# Patient Record
Sex: MALE | Race: WHITE | URBAN - NONMETROPOLITAN AREA
[De-identification: names, ages, dates, MRNs, and addresses within clinical notes are randomized per-mention and may not be internally consistent; named-entity substitution may affect disease eponyms.]

---

## 2022-02-27 ENCOUNTER — APPOINTMENT (OUTPATIENT)
Dept: GENERAL RADIOLOGY | Age: 22
End: 2022-02-27
Payer: MEDICAID

## 2022-02-27 ENCOUNTER — HOSPITAL ENCOUNTER (EMERGENCY)
Age: 22
Discharge: HOME | End: 2022-02-27
Attending: EMERGENCY MEDICINE
Payer: MEDICAID

## 2022-02-27 VITALS
OXYGEN SATURATION: 97 % | HEART RATE: 85 BPM | HEIGHT: 66 IN | TEMPERATURE: 98.1 F | BODY MASS INDEX: 37.77 KG/M2 | SYSTOLIC BLOOD PRESSURE: 140 MMHG | WEIGHT: 235 LBS | DIASTOLIC BLOOD PRESSURE: 74 MMHG | RESPIRATION RATE: 16 BRPM

## 2022-02-27 DIAGNOSIS — S30.0XXA LUMBAR CONTUSION, INITIAL ENCOUNTER: Primary | ICD-10-CM

## 2022-02-27 PROCEDURE — 72100 X-RAY EXAM L-S SPINE 2/3 VWS: CPT

## 2022-02-27 PROCEDURE — 99283 EMERGENCY DEPT VISIT LOW MDM: CPT

## 2022-02-27 PROCEDURE — 6370000000 HC RX 637 (ALT 250 FOR IP): Performed by: EMERGENCY MEDICINE

## 2022-02-27 RX ORDER — NAPROXEN 500 MG/1
500 TABLET ORAL 2 TIMES DAILY PRN
Qty: 20 TABLET | Refills: 0 | Status: SHIPPED | OUTPATIENT
Start: 2022-02-27 | End: 2022-03-09

## 2022-02-27 RX ORDER — CYCLOBENZAPRINE HCL 10 MG
10 TABLET ORAL 3 TIMES DAILY PRN
Qty: 9 TABLET | Refills: 0 | Status: SHIPPED | OUTPATIENT
Start: 2022-02-27 | End: 2022-03-02

## 2022-02-27 RX ORDER — LIDOCAINE 50 MG/G
1 PATCH TOPICAL DAILY
Qty: 30 PATCH | Refills: 0 | Status: SHIPPED | OUTPATIENT
Start: 2022-02-27 | End: 2022-03-29

## 2022-02-27 RX ORDER — NAPROXEN 500 MG/1
500 TABLET ORAL ONCE
Status: COMPLETED | OUTPATIENT
Start: 2022-02-27 | End: 2022-02-27

## 2022-02-27 RX ORDER — CYCLOBENZAPRINE HCL 10 MG
10 TABLET ORAL ONCE
Status: COMPLETED | OUTPATIENT
Start: 2022-02-27 | End: 2022-02-27

## 2022-02-27 RX ORDER — VENLAFAXINE HYDROCHLORIDE 75 MG/1
CAPSULE, EXTENDED RELEASE ORAL
COMMUNITY
Start: 2022-01-17

## 2022-02-27 RX ORDER — LIDOCAINE 4 G/G
1 PATCH TOPICAL DAILY
Status: DISCONTINUED | OUTPATIENT
Start: 2022-02-27 | End: 2022-02-27 | Stop reason: HOSPADM

## 2022-02-27 RX ADMIN — CYCLOBENZAPRINE 10 MG: 10 TABLET, FILM COATED ORAL at 18:10

## 2022-02-27 RX ADMIN — NAPROXEN 500 MG: 500 TABLET ORAL at 18:08

## 2022-02-27 ASSESSMENT — PAIN SCALES - GENERAL
PAINLEVEL_OUTOF10: 10
PAINLEVEL_OUTOF10: 10

## 2022-02-27 ASSESSMENT — PAIN DESCRIPTION - PAIN TYPE: TYPE: ACUTE PAIN

## 2022-02-27 ASSESSMENT — PAIN DESCRIPTION - ORIENTATION: ORIENTATION: LOWER

## 2022-02-27 ASSESSMENT — PAIN DESCRIPTION - LOCATION: LOCATION: BACK

## 2022-02-27 ASSESSMENT — PAIN DESCRIPTION - DESCRIPTORS: DESCRIPTORS: TIGHTNESS

## 2022-02-27 ASSESSMENT — PAIN - FUNCTIONAL ASSESSMENT: PAIN_FUNCTIONAL_ASSESSMENT: 0-10

## 2022-02-27 NOTE — ED PROVIDER NOTES
Emergency [facility] EMERGENCY DEPARTMENT    Patient: Angelina Salinas  MRN: [id number]  : 2000  Date of Evaluation: 2022  ED Provider: Terrance Call DO    Chief Complaint       Chief Complaint   Patient presents with    Back Pain     C/o back pain after falling on ice last night. Patient has not had any medication for the pain. Rayo Bill is a 22 y.o. male who presents to the emergency department with complaints of pain in his lumbar spine after slipping and falling on the ice on Friday night landing on his back. He had the same problem happened Saturday night. He slipped on the ice falling onto his low back. The patient is complaining of 10 out of pain in his. It does not radiate. He denies any incontinence or saddle anesthesia. He has not taken anything for the pain. He denies radiation of the pain. Numbness weakness tingling is absent. Loss of bowel or bladder control is absent. Saddle anesthesia absent. Fevers absent. IV Drug Use absent. ROS:     At least 10 systems reviewed and otherwise acutely negative except as in the [address] Past History     Past Medical History:   Diagnosis Date    Depression      History reviewed. No pertinent surgical history.   Social History     Socioeconomic History    Marital status: Single     Spouse name: None    Number of children: None    Years of education: None    Highest education level: None   Occupational History    None   Tobacco Use    Smoking status: Never Smoker    Smokeless tobacco: Never Used   Vaping Use    Vaping Use: Never used   Substance and Sexual Activity    Alcohol use: No    Drug use: No    Sexual activity: None   Other Topics Concern    None   Social History Narrative    None     Social Determinants of Health     Financial Resource Strain:     Difficulty of Paying Living Expenses: Not on file   Food Insecurity:     Worried About Running Out of Food in the Last Year: Not on file    Ran Out of Food in the Last Year: Not on file   Transportation Needs:     Lack of Transportation (Medical): Not on file    Lack of Transportation (Non-Medical): Not on file   Physical Activity:     Days of Exercise per Week: Not on file    Minutes of Exercise per Session: Not on file   Stress:     Feeling of Stress : Not on file   Social Connections:     Frequency of Communication with Friends and Family: Not on file    Frequency of Social Gatherings with Friends and Family: Not on file    Attends Voodoo Services: Not on file    Active Member of [address] [place] [address] [employer] or Organizations: Not on file    Attends Club or Organization Meetings: Not on file    Marital Status: Not on file   Intimate Partner Violence:     Fear of Current or Ex-Partner: Not on file    Emotionally Abused: Not on file    Physically Abused: Not on file    Sexually Abused: Not on file   Housing Stability:     Unable to Pay for Housing in the Last Year: Not on file    Number of [place] in the Last Year: Not on file    Unstable Housing in the Last Year: Not on file       Medications/Allergies     Previous Medications    VENLAFAXINE (EFFEXOR XR) 75 MG EXTENDED RELEASE CAPSULE    TAKE 1 CAPSULE BY MOUTH EVERY DAY WITH FOOD     No Known Allergies     Physical Exam       ED Triage Vitals [02/27/22 1732]   BP Temp Temp Source Pulse Resp SpO2 Height Weight   (!) 148/74 98.1 °F (36.7 °C) Oral 92 18 98 % 5' 6\" (1.676 m) 235 lb (106.6 kg)     GENERAL APPEARANCE: Awake and alert. Cooperative. Nontoxic in appearance  HEAD: Normocephalic. EYES: Sclera anicteric. ENT: Tolerates saliva. NECK: Supple. LUNGS: Respirations unlabored. BACK: There is not thoracic or lumbar midline tenderness to palpation or step-offs. Paraspinal tenderness to palpation is  present in the lumbar paraspinal muscular region. No overlying rashes. LE strength is 5/5. LE light touch is intact. LE DTR's are 2+ in the patellas and achilles.  Straight leg test is negative on the RIGHT, negative on the LEFT. SKIN: Warm and dry. Diagnostics   Labs:  No results found for this visit on 02/27/22. Radiographs:  No results found. ED Course and MDM   In brief, Ronnie Conroy is a 22 y.o. male who presented to the emergency department patient presents to emergency department after falling twice and landing on his back after slipping on the ice. Lumbar spine x-ray is pending at this time. [facility] Please see his note regarding final disposition of this patient. He did receive Naprosyn Flexeril and Lidoderm here in the emergency department. ED Medication Orders (From admission, onward)    Start Ordered     Status Ordering Provider    02/27/22 1745 02/27/22 1739  naproxen (NAPROSYN) tablet 500 mg  ONCE         Last MAR action: Given - by Areta Shirts on 02/27/22 at [facility]    02/27/22 1745 02/27/22 1739  cyclobenzaprine (FLEXERIL) tablet 10 mg  ONCE         Last MAR action: Given - by Areta Shirts on 02/27/22 at [address] [place] [address]    02/27/22 1745 02/27/22 1739  lidocaine 4 % external patch 1 patch  DAILY         Last MAR action: Patch Applied - by Areta Shirts on 02/27/22 at [facility], [facility]          I estimate there is LOW risk for (including but not limited to) RAPIDLY EXPANDING OR RUPTURED AAA, AORTIC DISSECTION, CAUDA EQUINA SYNDROME, or EPIDURAL MASS LESION thus I consider the discharge disposition reasonable. Ronnie Conroy (or their surrogate) and I have discussed the diagnosis and risks, and we agree with discharging home with close follow-up. We also discussed returning to the Emergency Department immediately if new or worsening symptoms occur. We have discussed the symptoms which are most concerning that necessitate immediate return. Final Impression      1.  Lumbar contusion, initial encounter        DISPOSITION         (Please note that portions of this note may have been completed with a voice recognition program. Efforts were made to edit the dictations but occasionally words are mis-transcribed.)    Andrey Ling, [address] [place] [address],   02/27/22 [id number]

## 2022-02-28 NOTE — ED NOTES
Discharge instructions and prescriptions reviewed with pt and verbalizes understanding.      Pardeep Montano RN  02/27/22 Danisha Bradford

## 2022-02-28 NOTE — ED PROVIDER NOTES
ADDENDUM:    Care of the patient was assumed  from Dr. Mayra Zapien. I have reviewed the notes, assessments, and/or procedures performed, I concur with her/his documentation on Chepe Davis. I reviewed the medical record and evaluated the patient. ED COURSE/MDM:  Laboratory and imaging data were reviewed and care plan was arranged with the patient(see separate lab/imaging reports). RADIOLOGY:  Already resulted studies have been reviewed. XR LUMBAR SPINE (2-3 VIEWS)   Final Result   Unremarkable examination of the lumbar spine. Labs Reviewed - No data to display    Medications   lidocaine 4 % external patch 1 patch (1 patch TransDERmal Patch Applied 2/27/22 1808)   naproxen (NAPROSYN) tablet 500 mg (500 mg Oral Given 2/27/22 1808)   cyclobenzaprine (FLEXERIL) tablet 10 mg (10 mg Oral Given 2/27/22 1810)       Vitals:    02/27/22 1732   BP: (!) 148/74   Pulse: 92   Resp: 18   Temp: 98.1 °F (36.7 °C)   TempSrc: Oral   SpO2: 98%   Weight: 235 lb (106.6 kg)   Height: 5' 6\" (1.676 m)       XR LUMBAR SPINE (2-3 VIEWS)   Final Result   Unremarkable examination of the lumbar spine. Supportive care. Safety discussed  My typical dicussion, presentation, and considerations for this patients' chief complaint, diagnosis, differential diagnosis, medications, medication use,  medication safety and medication interactions have been explained and outlined to this patient for this patient encounter. I have stressed need for follow up and reexamination for this encounter    FINAL IMPRESSION:  1. Lumbar contusion, initial encounter        New Prescriptions    CYCLOBENZAPRINE (FLEXERIL) 10 MG TABLET    Take 1 tablet by mouth 3 times daily as needed for Muscle spasms    LIDOCAINE (LIDODERM) 5 %    Place 1 patch onto the skin daily 12 hours on, 12 hours off.     NAPROXEN (NAPROSYN) 500 MG TABLET    Take 1 tablet by mouth 2 times daily as needed for Pain                 [address] Jack, DO  02/27/22 [address]

## 2022-02-28 NOTE — ED NOTES
Dr Bill PENA, [facility] in to discuss test results and plan for discharge.      Vanessa Castrejon RN  02/27/22 1926